# Patient Record
Sex: FEMALE | Race: WHITE | NOT HISPANIC OR LATINO | URBAN - METROPOLITAN AREA
[De-identification: names, ages, dates, MRNs, and addresses within clinical notes are randomized per-mention and may not be internally consistent; named-entity substitution may affect disease eponyms.]

---

## 2024-08-12 NOTE — PROGRESS NOTES
Assessment/Plan:  Pap every 3-5 years if normal, sexually transmitted infection testing as indicated, exercise most days of week, obtain appropriate diet and hydration, Calcium 1000mg + 600 vit D daily,   Annual mammogram starting at age 40, monthly breast self exam.   Reviewed poss causes of infertility.  Discussed timing of intercourse during ovulation-using basal body temp or ovulation predictor kits to predict ovulation. Limiting exposure to tobacco, alcohol as well as optimizing healthy diet. Offered to check FSH/LH estradiol Day 3. Check Prolactin, TSH and progesterone on Day 21 of 28 ay cycle. Discussed  Semen analysis for  Will hold off on labs for now  Weight gain will check TSH          Diagnoses and all orders for this visit:    Encounter for gynecological examination without abnormal finding  -     IGP, Aptima HPV, Rfx 16/18,45    Encounter for Papanicolaou smear for cervical cancer screening  -     IGP, Aptima HPV, Rfx 16/18,45    Screening for endocrine disorder  -     TSH, 3rd generation with Free T4 reflex; Future  -     TSH, 3rd generation with Free T4 reflex          Subjective:      Patient ID: Soraya Herrera is a 39 y.o. female.    New/former pt here for annual gyn G0 no concerns Periods monthly normal Denies abd or pelvic pain  No bc ok with preg although has not happened Prev on OCP D/C'd 2021 Remote h/o abn pap [facility]IL in 2014 returned to normal Last pap 2019 neg/neg HPV         The following portions of the patient's history were reviewed and updated as appropriate: allergies, current medications, past family history, past medical history, past social history, past surgical history, and problem list.    Review of Systems   Constitutional:  Negative for fatigue and unexpected weight change.   Gastrointestinal:  Negative for abdominal distention, abdominal pain, constipation and diarrhea.   Genitourinary:  Negative for difficulty urinating, dyspareunia, dysuria, frequency, genital sores,  "menstrual problem, pelvic pain, urgency, vaginal bleeding, vaginal discharge and vaginal pain.   Neurological:  Negative for headaches.   Psychiatric/Behavioral: Negative.  Negative for dysphoric mood. The patient is not nervous/anxious.          Objective:      /72 (BP Location: Right arm, Patient Position: Sitting, Cuff Size: Large)   Ht 5' 4.5\" (1.638 m)   Wt 90.3 kg (199 lb)   LMP 07/24/2024 (Exact Date)   BMI 33.63 kg/m²          Physical Exam  Vitals and nursing note reviewed.   Constitutional:       General: She is not in acute distress.     Appearance: Normal appearance.   HENT:      Head: Normocephalic and atraumatic.   Pulmonary:      Effort: Pulmonary effort is normal.   Chest:   Breasts:     Breasts are symmetrical.      Right: Normal. No mass, nipple discharge, skin change or tenderness.      Left: Normal. No mass, nipple discharge, skin change or tenderness.   Abdominal:      General: There is no distension.      Palpations: Abdomen is soft.      Tenderness: There is no abdominal tenderness. There is no guarding or rebound.   Genitourinary:     General: Normal vulva.      Exam position: Lithotomy position.      Labia:         Right: No rash, tenderness, lesion or injury.         Left: No rash, tenderness, lesion or injury.       Urethra: No prolapse, urethral pain, urethral swelling or urethral lesion.      Vagina: Normal. No erythema or lesions.      Cervix: No cervical motion tenderness, discharge, lesion or cervical bleeding.      Uterus: Normal.       Adnexa: Right adnexa normal and left adnexa normal.        Right: No mass or tenderness.          Left: No mass or tenderness.        Rectum: No mass or external hemorrhoid.   Musculoskeletal:         General: Normal range of motion.   Lymphadenopathy:      Upper Body:      Right upper body: No axillary adenopathy.      Left upper body: No axillary adenopathy.      Lower Body: No right inguinal adenopathy. No left inguinal adenopathy. "   Skin:     General: Skin is warm and dry.   Neurological:      Mental Status: She is alert and oriented to person, place, and time.   Psychiatric:         Mood and Affect: Mood normal.         Behavior: Behavior normal.         Thought Content: Thought content normal.         Judgment: Judgment normal.

## 2024-08-13 ENCOUNTER — OFFICE VISIT (OUTPATIENT)
Dept: OBGYN CLINIC | Facility: CLINIC | Age: 39
End: 2024-08-13
Payer: COMMERCIAL

## 2024-08-13 VITALS
SYSTOLIC BLOOD PRESSURE: 120 MMHG | DIASTOLIC BLOOD PRESSURE: 72 MMHG | WEIGHT: 199 LBS | HEIGHT: 65 IN | BODY MASS INDEX: 33.15 KG/M2

## 2024-08-13 DIAGNOSIS — Z13.29 SCREENING FOR ENDOCRINE DISORDER: ICD-10-CM

## 2024-08-13 DIAGNOSIS — Z12.4 ENCOUNTER FOR PAPANICOLAOU SMEAR FOR CERVICAL CANCER SCREENING: ICD-10-CM

## 2024-08-13 DIAGNOSIS — Z01.419 ENCOUNTER FOR GYNECOLOGICAL EXAMINATION WITHOUT ABNORMAL FINDING: Primary | ICD-10-CM

## 2024-08-13 PROCEDURE — S0610 ANNUAL GYNECOLOGICAL EXAMINA: HCPCS | Performed by: NURSE PRACTITIONER

## 2024-08-13 NOTE — PATIENT INSTRUCTIONS
Pap every 3-5 years if normal, sexually transmitted infection testing as indicated, exercise most days of week, obtain appropriate diet and hydration, Calcium 1000mg + 600 vit D daily,   Annual mammogram starting at age 40, monthly breast self exam.   Reviewed poss causes of infertility.  Discussed timing of intercourse during ovulation-using basal body temp or ovulation predictor kits to predict ovulation. Limiting exposure to tobacco, alcohol as well as optimizing healthy diet. Offered to check FSH/LH estradiol Day 3. Check Prolactin, TSH and progesterone on Day 21 of 28 ay cycle. Discussed  Semen analysis for  Will hold off on labs for now  Weight gain will check TSH

## 2024-08-14 LAB — TSH SERPL DL<=0.005 MIU/L-ACNC: 3.35 UIU/ML (ref 0.45–4.5)

## 2024-08-20 LAB
CYTOLOGIST CVX/VAG CYTO: ABNORMAL
DX ICD CODE: ABNORMAL
HPV GENOTYPE REFLEX: ABNORMAL
HPV I/H RISK 4 DNA CVX QL PROBE+SIG AMP: POSITIVE
HPV16 DNA CVX QL PROBE+SIG AMP: NEGATIVE
HPV18+45 E6+E7 MRNA CVX QL NAA+PROBE: NEGATIVE
OTHER STN SPEC: ABNORMAL
PATH REPORT.FINAL DX SPEC: ABNORMAL
SL AMB NOTE:: ABNORMAL
SL AMB SPECIMEN ADEQUACY: ABNORMAL
SL AMB TEST METHODOLOGY: ABNORMAL

## 2025-06-17 NOTE — PROGRESS NOTES
"Assessment/Plan:  No pregnancy with unprotected sex for over a year with ovulation testing and timed intercourse Will check Labs and refer to Zaheer Boyd. Will check FSH/LH estradiol Day 3. Check Prolactin, TSH and progesterone on Day 21 of 28 day cycle.   F?u [place] for repeat pap in August Diagnoses and all orders for this visit:    Fertility testing  -     FSH and LH; Future  -     Estradiol; Future  -     FSH and LH  -     Estradiol  -     Progesterone; Future  -     Prolactin; Future  -     TSH, 3rd generation with Free T4 reflex; Future  -     Antimullerian hormone (AMH); Future  -     Progesterone  -     Prolactin  -     TSH, 3rd generation with Free T4 reflex  -     Antimullerian hormone (AMH)          Subjective:      Patient ID: Soraya Herrera is a 40 y.o. female.    Here to discuss fertility options Last [place] 8/2024 PAP normal + other HPV Has been attempting pregnancy > 6 months Monthly normal periods Using ovulation testing with timed intercourse Interested in next steps         The following portions of the patient's history were reviewed and updated as appropriate: allergies, current medications, past family history, past medical history, past social history, past surgical history, and problem list.    Review of Systems   Constitutional:  Negative for fatigue and unexpected weight change.   Gastrointestinal:  Negative for abdominal distention, abdominal pain, constipation and diarrhea.   Genitourinary:  Negative for difficulty urinating, dyspareunia, dysuria, frequency, genital sores, menstrual problem, pelvic pain, urgency, vaginal bleeding, vaginal discharge and vaginal pain.   Neurological:  Negative for headaches.   Psychiatric/Behavioral: Negative.  Negative for dysphoric mood. The patient is not nervous/anxious.          Objective:      /76 (BP Location: Right arm, Patient Position: Sitting, Cuff Size: Standard)   Ht 5' 4.5\" (1.638 m)   Wt 89.4 kg (197 lb)   LMP 06/16/2025   BMI 33.29 " kg/m²          Physical Exam  Constitutional:       Appearance: Normal appearance.   HENT:      Head: Normocephalic and atraumatic.     Neurological:      General: No focal deficit present.      Mental Status: She is alert and oriented to person, place, and time.     Psychiatric:         Mood and Affect: Mood normal.         Behavior: Behavior normal.

## 2025-06-18 ENCOUNTER — OFFICE VISIT (OUTPATIENT)
Dept: OBGYN CLINIC | Facility: CLINIC | Age: 40
End: 2025-06-18
Payer: COMMERCIAL

## 2025-06-18 VITALS
SYSTOLIC BLOOD PRESSURE: 114 MMHG | DIASTOLIC BLOOD PRESSURE: 76 MMHG | HEIGHT: 65 IN | BODY MASS INDEX: 32.82 KG/M2 | WEIGHT: 197 LBS

## 2025-06-18 DIAGNOSIS — Z31.41 FERTILITY TESTING: Primary | ICD-10-CM

## 2025-06-18 PROCEDURE — 99213 OFFICE O/P EST LOW 20 MIN: CPT | Performed by: NURSE PRACTITIONER

## 2025-06-19 LAB
ESTRADIOL SERPL-MCNC: 40.6 PG/ML
FSH SERPL-ACNC: 7.2 MIU/ML
LH SERPL-ACNC: 4.9 MIU/ML

## 2025-06-21 NOTE — PATIENT INSTRUCTIONS
No pregnancy with unprotected sex for over a year with ovulation testing and timed intercourse Will check Labs and refer to Zaheer Boyd. Will check FSH/LH estradiol Day 3. Check Prolactin, TSH and progesterone on Day 21 of 28 day cycle.   F?u [place] for repeat pap in August

## 2025-07-10 LAB
MIS SERPL-MCNC: 3.15 NG/ML
PROGEST SERPL-MCNC: 8.7 NG/ML
PROLACTIN SERPL-MCNC: 28.4 NG/ML (ref 4.8–33.4)
TSH SERPL DL<=0.005 MIU/L-ACNC: 3.53 UIU/ML (ref 0.45–4.5)